# Patient Record
Sex: FEMALE | Race: WHITE | Employment: FULL TIME | URBAN - METROPOLITAN AREA
[De-identification: names, ages, dates, MRNs, and addresses within clinical notes are randomized per-mention and may not be internally consistent; named-entity substitution may affect disease eponyms.]

---

## 2017-01-13 PROCEDURE — 86334 IMMUNOFIX E-PHORESIS SERUM: CPT | Performed by: INTERNAL MEDICINE

## 2017-01-13 PROCEDURE — 84156 ASSAY OF PROTEIN URINE: CPT | Performed by: INTERNAL MEDICINE

## 2017-01-13 PROCEDURE — 82570 ASSAY OF URINE CREATININE: CPT | Performed by: INTERNAL MEDICINE

## 2017-01-13 PROCEDURE — 80069 RENAL FUNCTION PANEL: CPT | Performed by: INTERNAL MEDICINE

## 2017-01-13 PROCEDURE — 82784 ASSAY IGA/IGD/IGG/IGM EACH: CPT | Performed by: INTERNAL MEDICINE

## 2017-01-13 PROCEDURE — 84165 PROTEIN E-PHORESIS SERUM: CPT | Performed by: INTERNAL MEDICINE

## 2017-01-13 PROCEDURE — 83883 ASSAY NEPHELOMETRY NOT SPEC: CPT | Performed by: INTERNAL MEDICINE

## 2017-01-29 ENCOUNTER — HOSPITAL ENCOUNTER (EMERGENCY)
Facility: HOSPITAL | Age: 47
Discharge: HOME | End: 2017-01-29
Attending: EMERGENCY MEDICINE
Payer: COMMERCIAL

## 2017-01-29 VITALS
BODY MASS INDEX: 45.99 KG/M2 | OXYGEN SATURATION: 94 % | HEART RATE: 109 BPM | HEIGHT: 67 IN | WEIGHT: 293 LBS | SYSTOLIC BLOOD PRESSURE: 104 MMHG | TEMPERATURE: 96 F | DIASTOLIC BLOOD PRESSURE: 73 MMHG | RESPIRATION RATE: 20 BRPM

## 2017-01-29 DIAGNOSIS — N30.01 ACUTE CYSTITIS WITH HEMATURIA: Primary | ICD-10-CM

## 2017-01-29 LAB
ALBUMIN SERPL-MCNC: 2.1 G/DL (ref 3.5–4.8)
ALP LIVER SERPL-CCNC: 78 U/L (ref 37–98)
ALT SERPL-CCNC: 23 U/L (ref 14–54)
AST SERPL-CCNC: 9 U/L (ref 15–41)
BASOPHILS # BLD AUTO: 0.05 X10(3) UL (ref 0–0.1)
BASOPHILS NFR BLD AUTO: 0.2 %
BILIRUB SERPL-MCNC: 0.2 MG/DL (ref 0.1–2)
BILIRUB UR QL STRIP.AUTO: NEGATIVE
BUN BLD-MCNC: 17 MG/DL (ref 8–20)
CALCIUM BLD-MCNC: 8 MG/DL (ref 8.3–10.3)
CHLORIDE: 102 MMOL/L (ref 101–111)
CO2: 33 MMOL/L (ref 22–32)
COLOR UR AUTO: YELLOW
CREAT BLD-MCNC: 0.84 MG/DL (ref 0.55–1.02)
EOSINOPHIL # BLD AUTO: 0.07 X10(3) UL (ref 0–0.3)
EOSINOPHIL NFR BLD AUTO: 0.3 %
ERYTHROCYTE [DISTWIDTH] IN BLOOD BY AUTOMATED COUNT: 14.6 % (ref 11.5–16)
GLUCOSE BLD-MCNC: 193 MG/DL (ref 70–99)
GLUCOSE UR STRIP.AUTO-MCNC: NEGATIVE MG/DL
HCT VFR BLD AUTO: 42.2 % (ref 34–50)
HGB BLD-MCNC: 13.5 G/DL (ref 12–16)
IMMATURE GRANULOCYTE COUNT: 0.22 X10(3) UL (ref 0–1)
IMMATURE GRANULOCYTE RATIO %: 1.1 %
KETONES UR STRIP.AUTO-MCNC: NEGATIVE MG/DL
LYMPHOCYTES # BLD AUTO: 2.84 X10(3) UL (ref 0.9–4)
LYMPHOCYTES NFR BLD AUTO: 14.1 %
M PROTEIN MFR SERPL ELPH: 5.1 G/DL (ref 6.1–8.3)
MCH RBC QN AUTO: 31 PG (ref 27–33.2)
MCHC RBC AUTO-ENTMCNC: 32 G/DL (ref 31–37)
MCV RBC AUTO: 96.8 FL (ref 81–100)
MONOCYTES # BLD AUTO: 0.84 X10(3) UL (ref 0.1–0.6)
MONOCYTES NFR BLD AUTO: 4.2 %
NEUTROPHIL ABS PRELIM: 16.1 X10 (3) UL (ref 1.3–6.7)
NEUTROPHILS # BLD AUTO: 16.1 X10(3) UL (ref 1.3–6.7)
NEUTROPHILS NFR BLD AUTO: 80.1 %
NITRITE UR QL STRIP.AUTO: POSITIVE
PH UR STRIP.AUTO: 6 [PH] (ref 4.5–8)
PLATELET # BLD AUTO: 169 10(3)UL (ref 150–450)
POTASSIUM SERPL-SCNC: 3.9 MMOL/L (ref 3.6–5.1)
PROT UR STRIP.AUTO-MCNC: >=500 MG/DL
RBC # BLD AUTO: 4.36 X10(6)UL (ref 3.8–5.1)
RBC #/AREA URNS AUTO: >10 /HPF
RED CELL DISTRIBUTION WIDTH-SD: 51.9 FL (ref 35.1–46.3)
SODIUM SERPL-SCNC: 143 MMOL/L (ref 136–144)
SP GR UR STRIP.AUTO: 1.02 (ref 1–1.03)
UROBILINOGEN UR STRIP.AUTO-MCNC: <2 MG/DL
WBC # BLD AUTO: 20.1 X10(3) UL (ref 4–13)
WBC #/AREA URNS AUTO: >50 /HPF

## 2017-01-29 PROCEDURE — 36415 COLL VENOUS BLD VENIPUNCTURE: CPT

## 2017-01-29 PROCEDURE — 87086 URINE CULTURE/COLONY COUNT: CPT | Performed by: EMERGENCY MEDICINE

## 2017-01-29 PROCEDURE — 99283 EMERGENCY DEPT VISIT LOW MDM: CPT

## 2017-01-29 PROCEDURE — 81001 URINALYSIS AUTO W/SCOPE: CPT | Performed by: EMERGENCY MEDICINE

## 2017-01-29 PROCEDURE — 87186 SC STD MICRODIL/AGAR DIL: CPT | Performed by: EMERGENCY MEDICINE

## 2017-01-29 PROCEDURE — 85025 COMPLETE CBC W/AUTO DIFF WBC: CPT | Performed by: EMERGENCY MEDICINE

## 2017-01-29 PROCEDURE — 87077 CULTURE AEROBIC IDENTIFY: CPT | Performed by: EMERGENCY MEDICINE

## 2017-01-29 PROCEDURE — 80053 COMPREHEN METABOLIC PANEL: CPT | Performed by: EMERGENCY MEDICINE

## 2017-01-29 RX ORDER — CIPROFLOXACIN 250 MG/1
250 TABLET, FILM COATED ORAL 2 TIMES DAILY
Qty: 14 TABLET | Refills: 0 | Status: SHIPPED | OUTPATIENT
Start: 2017-01-29 | End: 2017-02-05

## 2017-01-30 NOTE — ED PROVIDER NOTES
Patient Seen in: [facility] Emergency Department    History   Patient presents with:  Bleeding (hematologic)    Stated Complaint: hematuria-chemo last Friday    HPI    Patient presents with pink tinged urine.   The patient states that she noticed the insulin aspart 100 UNIT/ML Subcutaneous Solution Pen-injector,  Inject 15-30 Units into the skin TID CC and HS. Use sliding scale as previous instructed.    Prochlorperazine Maleate (COMPAZINE) 10 mg tablet,  Take 1 tablet (10 mg total) by mouth every 6 (si 01/29/17 2016 140/93 mmHg   Pulse 01/29/17 2016 120   Resp 01/29/17 2016 24   Temp 01/29/17 2016 96.2 °F (35.7 °C)   Temp src 01/29/17 2016 Temporal   SpO2 01/29/17 2016 92 %   O2 Device 01/29/17 2016 None (Room air)       Current:/73 mmHg  Pulse 109 DIFFERENTIAL WITH PLATELET.   Procedure                               Abnormality         Status                     ---------                               -----------         ------                     CBC W/ DIFFERENTIAL[[id number]]          Abnormal

## 2017-01-30 NOTE — ED INITIAL ASSESSMENT (HPI)
Pt presents with c/o hematuria. Chemo for vasculitis one week ago, was told one of the side effects could be blood in the urine. Dysuria, no fevers, no lower abdominal pain.

## 2017-02-02 ENCOUNTER — HOSPITAL ENCOUNTER (EMERGENCY)
Facility: HOSPITAL | Age: 47
Discharge: HOME | End: 2017-02-02
Attending: EMERGENCY MEDICINE
Payer: COMMERCIAL

## 2017-02-02 VITALS
DIASTOLIC BLOOD PRESSURE: 72 MMHG | HEIGHT: 67 IN | SYSTOLIC BLOOD PRESSURE: 116 MMHG | OXYGEN SATURATION: 100 % | RESPIRATION RATE: 22 BRPM | TEMPERATURE: 100 F | BODY MASS INDEX: 45.99 KG/M2 | HEART RATE: 120 BPM | WEIGHT: 293 LBS

## 2017-02-02 DIAGNOSIS — K52.9 GASTROENTERITIS: Primary | ICD-10-CM

## 2017-02-02 LAB
ALBUMIN SERPL-MCNC: 2.5 G/DL (ref 3.5–4.8)
ALP LIVER SERPL-CCNC: 72 U/L (ref 37–98)
ALT SERPL-CCNC: 23 U/L (ref 14–54)
AST SERPL-CCNC: 13 U/L (ref 15–41)
BASOPHILS # BLD AUTO: 0.07 X10(3) UL (ref 0–0.1)
BASOPHILS NFR BLD AUTO: 0.6 %
BILIRUB SERPL-MCNC: 0.7 MG/DL (ref 0.1–2)
BILIRUB UR QL STRIP.AUTO: NEGATIVE
BUN BLD-MCNC: 17 MG/DL (ref 8–20)
CALCIUM BLD-MCNC: 8.6 MG/DL (ref 8.3–10.3)
CHLORIDE: 103 MMOL/L (ref 101–111)
CO2: 32 MMOL/L (ref 22–32)
CREAT BLD-MCNC: 0.88 MG/DL (ref 0.55–1.02)
EOSINOPHIL # BLD AUTO: 0.11 X10(3) UL (ref 0–0.3)
EOSINOPHIL NFR BLD AUTO: 0.9 %
ERYTHROCYTE [DISTWIDTH] IN BLOOD BY AUTOMATED COUNT: 15 % (ref 11.5–16)
GLUCOSE BLD-MCNC: 162 MG/DL (ref 70–99)
GLUCOSE UR STRIP.AUTO-MCNC: NEGATIVE MG/DL
HCT VFR BLD AUTO: 50.5 % (ref 34–50)
HGB BLD-MCNC: 16.3 G/DL (ref 12–16)
IMMATURE GRANULOCYTE COUNT: 0.17 X10(3) UL (ref 0–1)
IMMATURE GRANULOCYTE RATIO %: 1.4 %
KETONES UR STRIP.AUTO-MCNC: NEGATIVE MG/DL
LEUKOCYTE ESTERASE UR QL STRIP.AUTO: NEGATIVE
LYMPHOCYTES # BLD AUTO: 0.94 X10(3) UL (ref 0.9–4)
LYMPHOCYTES NFR BLD AUTO: 7.8 %
M PROTEIN MFR SERPL ELPH: 5.9 G/DL (ref 6.1–8.3)
MCH RBC QN AUTO: 31.2 PG (ref 27–33.2)
MCHC RBC AUTO-ENTMCNC: 32.3 G/DL (ref 31–37)
MCV RBC AUTO: 96.7 FL (ref 81–100)
MONOCYTES # BLD AUTO: 0.66 X10(3) UL (ref 0.1–0.6)
MONOCYTES NFR BLD AUTO: 5.5 %
NEUTROPHIL ABS PRELIM: 10.12 X10 (3) UL (ref 1.3–6.7)
NEUTROPHILS # BLD AUTO: 10.12 X10(3) UL (ref 1.3–6.7)
NEUTROPHILS NFR BLD AUTO: 83.8 %
NITRITE UR QL STRIP.AUTO: NEGATIVE
PH UR STRIP.AUTO: 5 [PH] (ref 4.5–8)
PLATELET # BLD AUTO: 168 10(3)UL (ref 150–450)
POTASSIUM SERPL-SCNC: 3.7 MMOL/L (ref 3.6–5.1)
PROT UR STRIP.AUTO-MCNC: >=500 MG/DL
RBC # BLD AUTO: 5.22 X10(6)UL (ref 3.8–5.1)
RBC #/AREA URNS AUTO: >10 /HPF
RED CELL DISTRIBUTION WIDTH-SD: 52.2 FL (ref 35.1–46.3)
SODIUM SERPL-SCNC: 142 MMOL/L (ref 136–144)
SP GR UR STRIP.AUTO: 1.02 (ref 1–1.03)
UROBILINOGEN UR STRIP.AUTO-MCNC: <2 MG/DL
WBC # BLD AUTO: 12.1 X10(3) UL (ref 4–13)

## 2017-02-02 PROCEDURE — 99284 EMERGENCY DEPT VISIT MOD MDM: CPT

## 2017-02-02 PROCEDURE — 81001 URINALYSIS AUTO W/SCOPE: CPT | Performed by: EMERGENCY MEDICINE

## 2017-02-02 PROCEDURE — 96361 HYDRATE IV INFUSION ADD-ON: CPT

## 2017-02-02 PROCEDURE — 96360 HYDRATION IV INFUSION INIT: CPT

## 2017-02-02 PROCEDURE — 80053 COMPREHEN METABOLIC PANEL: CPT

## 2017-02-02 PROCEDURE — 87086 URINE CULTURE/COLONY COUNT: CPT | Performed by: EMERGENCY MEDICINE

## 2017-02-02 PROCEDURE — 85025 COMPLETE CBC W/AUTO DIFF WBC: CPT

## 2017-02-02 PROCEDURE — 99283 EMERGENCY DEPT VISIT LOW MDM: CPT

## 2017-02-02 RX ORDER — ONDANSETRON 4 MG/1
4 TABLET, ORALLY DISINTEGRATING ORAL EVERY 4 HOURS PRN
Qty: 10 TABLET | Refills: 0 | Status: SHIPPED | OUTPATIENT
Start: 2017-02-02 | End: 2017-02-09

## 2017-02-02 NOTE — ED PROVIDER NOTES
Patient Seen in: [facility] Emergency Department    History   Patient presents with:  Nausea/Vomiting/Diarrhea (gastrointestinal)    Stated Complaint: n/v/d    HPI    Patient presents with vomiting and diarrhea.   The symptoms started at 3 in the mor X 8 MM Does not apply Misc,  Use with insulin pen   KENDRICK MICROLET LANCETS Does not apply Misc,  Test blood sugar tid   Alcohol Swabs (ALCOHOL PREP) Does not apply Pads,  Use alcohol swab to clean skin before injecting insulin   insulin aspart 100 UNIT/ML HPI.    Physical Exam     ED Triage Vitals   BP 02/02/17 1140 122/69 mmHg   Pulse 02/02/17 1140 126   Resp 02/02/17 1140 24   Temp 02/02/17 1140 99.8 °F (37.7 °C)   Temp src 02/02/17 1140 Temporal   SpO2 02/02/17 1140 94 %   O2 Device 02/02/17 1140 None (R Abnormality         Status                     ---------                               -----------         ------                     CBC W/ DIFFERENTIAL[[id number]]          Abnormal            Final result                 Please view results for these paulino

## 2017-02-02 NOTE — ED NOTES
Rounded on pt, she is drinking fluids well. sts she is still unable to urinate for urine spec. Supportive friend remains at bedside. Updated on POC.

## 2017-02-02 NOTE — ED NOTES
Rounded on pt, sts he is very thirsty. Per MD, ok for PO. Juice and water given. Friends at bedside. Updated on POC.

## 2017-02-24 PROCEDURE — 82570 ASSAY OF URINE CREATININE: CPT | Performed by: INTERNAL MEDICINE

## 2017-02-24 PROCEDURE — 84156 ASSAY OF PROTEIN URINE: CPT | Performed by: INTERNAL MEDICINE

## 2017-03-10 ENCOUNTER — HOSPITAL ENCOUNTER (OUTPATIENT)
Dept: OCCUPATIONAL MEDICINE | Facility: HOSPITAL | Age: 47
Setting detail: THERAPIES SERIES
End: 2017-03-10
Attending: INTERNAL MEDICINE
Payer: COMMERCIAL

## 2017-03-14 PROCEDURE — 87077 CULTURE AEROBIC IDENTIFY: CPT | Performed by: EMERGENCY MEDICINE

## 2017-03-14 PROCEDURE — 87186 SC STD MICRODIL/AGAR DIL: CPT | Performed by: EMERGENCY MEDICINE

## 2017-03-14 PROCEDURE — 87086 URINE CULTURE/COLONY COUNT: CPT | Performed by: EMERGENCY MEDICINE

## 2017-03-15 ENCOUNTER — HOSPITAL ENCOUNTER (OUTPATIENT)
Dept: OCCUPATIONAL MEDICINE | Facility: HOSPITAL | Age: 47
Setting detail: THERAPIES SERIES
Discharge: HOME | End: 2017-03-15
Attending: INTERNAL MEDICINE
Payer: COMMERCIAL

## 2017-03-15 DIAGNOSIS — R60.0 LOCALIZED EDEMA: Primary | ICD-10-CM

## 2017-03-15 PROCEDURE — 97167 OT EVAL HIGH COMPLEX 60 MIN: CPT

## 2017-03-15 PROCEDURE — 97535 SELF CARE MNGMENT TRAINING: CPT

## 2017-03-15 NOTE — PROGRESS NOTES
OCCUPATIONAL THERAPY LE LYMPHEDEMA EVALUATION:   Referring Physician: Dr. Thaddeus Chacko  Diagnosis: Localized edema (R60.0) in abdomen      Date of Service: 3/15/2017     PATIENT Veronica Shaw is a 40year old y/o female who presents with report of ons per-oral esophageal myotomy for achalasia in 2011;  hernia repair 2008; cardiac arrhythmia; RF ablation for supraventricular tachycardia in 1994; obesity; hidradenitis supporativa; HTN  Precautions:  Lymphedema; renal disease; abdominal sxs; cardiac; HTN garments for her lower legs to assist with volume reduction prior to transitioning into compression stockings.  It is also highly likely that she will need a sequential compression device to assist her with life-long self-management of her volume with an ad Proximal 1/2 of lower legs are boggy; distal 1/2 are densely boggy, pitting with medial surface being more involved than lateral. Ankles are boggy, slightly pitting. Dorsal feet are boggy, pitting. Good tissue hydration.  Negative Stemmer's sign bilaterally lymph drainage, and lymphedema precautions for life-long self-management of lymphedema.  30 sessions     Frequency / Duration: Patient will be seen for 2 x/week or a total of 30 visits over a 90 day period to address edema volume in her upper quadrant and p

## 2017-03-17 PROBLEM — I89.0 LYMPHEDEMA OF BOTH UPPER EXTREMITIES: Status: ACTIVE | Noted: 2017-03-17

## 2017-03-17 PROBLEM — I89.0 LYMPHEDEMA OF BOTH UPPER EXTREMITIES: Status: RESOLVED | Noted: 2017-03-17 | Resolved: 2017-03-17

## 2017-03-17 PROBLEM — I89.0 LYMPHEDEMA OF BOTH LOWER EXTREMITIES: Status: ACTIVE | Noted: 2017-03-17

## 2017-03-20 ENCOUNTER — APPOINTMENT (OUTPATIENT)
Dept: OCCUPATIONAL MEDICINE | Facility: HOSPITAL | Age: 47
End: 2017-03-20
Attending: INTERNAL MEDICINE
Payer: COMMERCIAL

## 2017-03-21 ENCOUNTER — APPOINTMENT (OUTPATIENT)
Dept: OCCUPATIONAL MEDICINE | Facility: HOSPITAL | Age: 47
End: 2017-03-21
Attending: INTERNAL MEDICINE
Payer: COMMERCIAL

## 2017-03-23 ENCOUNTER — HOSPITAL ENCOUNTER (OUTPATIENT)
Dept: OCCUPATIONAL MEDICINE | Facility: HOSPITAL | Age: 47
Setting detail: THERAPIES SERIES
Discharge: HOME | End: 2017-03-23
Attending: INTERNAL MEDICINE
Payer: COMMERCIAL

## 2017-03-23 PROCEDURE — 97140 MANUAL THERAPY 1/> REGIONS: CPT

## 2017-03-23 PROCEDURE — 97535 SELF CARE MNGMENT TRAINING: CPT

## 2017-03-24 NOTE — PROGRESS NOTES
Dx: Localized edema (R60.0) in abdomen         Authorized # of Visits:  2/30       Next MD visit/plan renewal:  ???  Fall Risk: Standard          Precautions:  Lymphedema; poor standing / ambulation endurance.         Subjective:   Pt reporting she had a ba be independent in use of compression garments, self-manual lymph drainage, and lymphedema precautions for life-long self-management of lymphedema. 30 sessions     Plan:   Continue OT per POC to obtain above goals.     Charges: 2xMT, 2x self care home manage

## 2017-03-28 ENCOUNTER — HOSPITAL ENCOUNTER (OUTPATIENT)
Dept: OCCUPATIONAL MEDICINE | Facility: HOSPITAL | Age: 47
Setting detail: THERAPIES SERIES
Discharge: HOME | End: 2017-03-28
Attending: INTERNAL MEDICINE
Payer: COMMERCIAL

## 2017-03-28 PROCEDURE — 36415 COLL VENOUS BLD VENIPUNCTURE: CPT | Performed by: INTERNAL MEDICINE

## 2017-03-28 PROCEDURE — 82570 ASSAY OF URINE CREATININE: CPT | Performed by: INTERNAL MEDICINE

## 2017-03-28 PROCEDURE — 84156 ASSAY OF PROTEIN URINE: CPT | Performed by: INTERNAL MEDICINE

## 2017-03-28 PROCEDURE — 97140 MANUAL THERAPY 1/> REGIONS: CPT

## 2017-03-29 NOTE — PROGRESS NOTES
Dx: Localized edema (R60.0) in abdomen         Authorized # of Visits:  3/30       Next MD visit/plan renewal:  4/15/2017  Fall Risk: Standard          Precautions:  Lymphedema; renal disease; abdominal sxs; cardiac; HTN      Subjective:   *Pt reports she area.   *With caregiver needing to leave upon returning pt to home, advised pt to use towel roll/robe belt method to elevate panniculus upon return to home.    COMPRESSION:  N/A    Assessment:   Pt with good follow through with recommendations for home elev

## 2017-03-30 ENCOUNTER — HOSPITAL ENCOUNTER (OUTPATIENT)
Dept: OCCUPATIONAL MEDICINE | Facility: HOSPITAL | Age: 47
Setting detail: THERAPIES SERIES
Discharge: HOME | End: 2017-03-30
Attending: INTERNAL MEDICINE
Payer: COMMERCIAL

## 2017-03-30 PROCEDURE — 97140 MANUAL THERAPY 1/> REGIONS: CPT

## 2017-03-30 NOTE — PROGRESS NOTES
Dx: Localized edema (R60.0) in abdomen         Authorized # of Visits:  4/30       Next MD visit/plan renewal:  4/15/2017  Fall Risk: Standard          Precautions:  Lymphedema; renal disease; abdominal sxs; cardiac; HTN      Subjective:   *Pt reports she sessions  3. Pt will tolerate use of abdominal binder for panniculus compression for  2-4 hours on days that caregiver is present. 6 sessions  4.  Pt will be independent in self-manual lymph drainage of upper quadrant and panniculus in areas of functional r

## 2017-04-04 ENCOUNTER — TELEPHONE (OUTPATIENT)
Dept: OCCUPATIONAL MEDICINE | Facility: HOSPITAL | Age: 47
End: 2017-04-04

## 2017-04-04 ENCOUNTER — APPOINTMENT (OUTPATIENT)
Dept: OCCUPATIONAL MEDICINE | Facility: HOSPITAL | Age: 47
End: 2017-04-04
Attending: INTERNAL MEDICINE
Payer: COMMERCIAL

## 2017-04-04 NOTE — TELEPHONE ENCOUNTER
Message received that pt called and cancelled today's session as she did not have a caregiver to provide needed transportation and she is unable to physically drive herself today.

## 2017-04-06 ENCOUNTER — HOSPITAL ENCOUNTER (OUTPATIENT)
Dept: OCCUPATIONAL MEDICINE | Facility: HOSPITAL | Age: 47
Setting detail: THERAPIES SERIES
Discharge: HOME | End: 2017-04-06
Attending: INTERNAL MEDICINE
Payer: COMMERCIAL

## 2017-04-06 PROCEDURE — 97140 MANUAL THERAPY 1/> REGIONS: CPT

## 2017-04-07 ENCOUNTER — TELEPHONE (OUTPATIENT)
Dept: OCCUPATIONAL MEDICINE | Facility: HOSPITAL | Age: 47
End: 2017-04-07

## 2017-04-07 ENCOUNTER — APPOINTMENT (OUTPATIENT)
Dept: GENERAL RADIOLOGY | Facility: HOSPITAL | Age: 47
DRG: 206 | End: 2017-04-07
Attending: EMERGENCY MEDICINE
Payer: COMMERCIAL

## 2017-04-07 ENCOUNTER — APPOINTMENT (OUTPATIENT)
Dept: CT IMAGING | Facility: HOSPITAL | Age: 47
DRG: 206 | End: 2017-04-07
Attending: EMERGENCY MEDICINE
Payer: COMMERCIAL

## 2017-04-07 ENCOUNTER — HOSPITAL ENCOUNTER (INPATIENT)
Facility: HOSPITAL | Age: 47
LOS: 1 days | Discharge: HOME | DRG: 206 | End: 2017-04-08
Attending: EMERGENCY MEDICINE | Admitting: HOSPITALIST
Payer: COMMERCIAL

## 2017-04-07 DIAGNOSIS — R09.02 HYPOXIA: ICD-10-CM

## 2017-04-07 DIAGNOSIS — R06.00 DYSPNEA, UNSPECIFIED TYPE: Primary | ICD-10-CM

## 2017-04-07 PROBLEM — E87.3 METABOLIC ALKALOSIS: Status: ACTIVE | Noted: 2017-04-07

## 2017-04-07 PROBLEM — R73.9 HYPERGLYCEMIA: Status: ACTIVE | Noted: 2017-04-07

## 2017-04-07 PROBLEM — E87.6 HYPOKALEMIA: Status: ACTIVE | Noted: 2017-04-07

## 2017-04-07 PROBLEM — D72.829 LEUKOCYTOSIS: Status: ACTIVE | Noted: 2017-04-07

## 2017-04-07 PROCEDURE — 93005 ELECTROCARDIOGRAM TRACING: CPT

## 2017-04-07 PROCEDURE — 93010 ELECTROCARDIOGRAM REPORT: CPT

## 2017-04-07 PROCEDURE — 85610 PROTHROMBIN TIME: CPT | Performed by: EMERGENCY MEDICINE

## 2017-04-07 PROCEDURE — 96360 HYDRATION IV INFUSION INIT: CPT

## 2017-04-07 PROCEDURE — 85378 FIBRIN DEGRADE SEMIQUANT: CPT | Performed by: EMERGENCY MEDICINE

## 2017-04-07 PROCEDURE — 99285 EMERGENCY DEPT VISIT HI MDM: CPT

## 2017-04-07 PROCEDURE — 84145 PROCALCITONIN (PCT): CPT | Performed by: EMERGENCY MEDICINE

## 2017-04-07 PROCEDURE — 85730 THROMBOPLASTIN TIME PARTIAL: CPT | Performed by: EMERGENCY MEDICINE

## 2017-04-07 PROCEDURE — 82962 GLUCOSE BLOOD TEST: CPT

## 2017-04-07 PROCEDURE — 84484 ASSAY OF TROPONIN QUANT: CPT | Performed by: EMERGENCY MEDICINE

## 2017-04-07 PROCEDURE — 85025 COMPLETE CBC W/AUTO DIFF WBC: CPT | Performed by: EMERGENCY MEDICINE

## 2017-04-07 PROCEDURE — 80053 COMPREHEN METABOLIC PANEL: CPT | Performed by: EMERGENCY MEDICINE

## 2017-04-07 PROCEDURE — 96361 HYDRATE IV INFUSION ADD-ON: CPT

## 2017-04-07 PROCEDURE — 71010 XR CHEST AP PORTABLE  (CPT=71010): CPT

## 2017-04-07 RX ORDER — ONDANSETRON 2 MG/ML
4 INJECTION INTRAMUSCULAR; INTRAVENOUS EVERY 6 HOURS PRN
Status: DISCONTINUED | OUTPATIENT
Start: 2017-04-07 | End: 2017-04-08

## 2017-04-07 RX ORDER — METOCLOPRAMIDE HYDROCHLORIDE 5 MG/ML
10 INJECTION INTRAMUSCULAR; INTRAVENOUS EVERY 8 HOURS PRN
Status: DISCONTINUED | OUTPATIENT
Start: 2017-04-07 | End: 2017-04-08

## 2017-04-07 RX ORDER — PREDNISONE 10 MG/1
10 TABLET ORAL DAILY
Status: DISCONTINUED | OUTPATIENT
Start: 2017-04-07 | End: 2017-04-08

## 2017-04-07 RX ORDER — ATORVASTATIN CALCIUM 40 MG/1
40 TABLET, FILM COATED ORAL NIGHTLY
Status: DISCONTINUED | OUTPATIENT
Start: 2017-04-07 | End: 2017-04-08

## 2017-04-07 RX ORDER — SODIUM CHLORIDE 9 MG/ML
INJECTION, SOLUTION INTRAVENOUS CONTINUOUS
Status: DISCONTINUED | OUTPATIENT
Start: 2017-04-07 | End: 2017-04-08

## 2017-04-07 RX ORDER — LISINOPRIL 40 MG/1
40 TABLET ORAL DAILY
COMMUNITY

## 2017-04-07 RX ORDER — DEXTROSE MONOHYDRATE 25 G/50ML
50 INJECTION, SOLUTION INTRAVENOUS
Status: DISCONTINUED | OUTPATIENT
Start: 2017-04-07 | End: 2017-04-08

## 2017-04-07 RX ORDER — LISINOPRIL 40 MG/1
40 TABLET ORAL DAILY
Status: DISCONTINUED | OUTPATIENT
Start: 2017-04-08 | End: 2017-04-08

## 2017-04-07 RX ORDER — ONDANSETRON 2 MG/ML
4 INJECTION INTRAMUSCULAR; INTRAVENOUS EVERY 4 HOURS PRN
Status: CANCELLED | OUTPATIENT
Start: 2017-04-07

## 2017-04-07 RX ORDER — PANTOPRAZOLE SODIUM 40 MG/1
40 TABLET, DELAYED RELEASE ORAL
Status: DISCONTINUED | OUTPATIENT
Start: 2017-04-08 | End: 2017-04-08

## 2017-04-07 RX ORDER — HEPARIN SODIUM 5000 [USP'U]/ML
5000 INJECTION, SOLUTION INTRAVENOUS; SUBCUTANEOUS EVERY 8 HOURS
Status: DISCONTINUED | OUTPATIENT
Start: 2017-04-07 | End: 2017-04-08

## 2017-04-07 RX ORDER — POTASSIUM CHLORIDE 20 MEQ/1
40 TABLET, EXTENDED RELEASE ORAL EVERY 4 HOURS
Status: COMPLETED | OUTPATIENT
Start: 2017-04-07 | End: 2017-04-08

## 2017-04-07 RX ORDER — HYDROCODONE BITARTRATE AND ACETAMINOPHEN 5; 325 MG/1; MG/1
1 TABLET ORAL 2 TIMES DAILY PRN
Status: DISCONTINUED | OUTPATIENT
Start: 2017-04-07 | End: 2017-04-08

## 2017-04-07 RX ORDER — ACETAMINOPHEN 325 MG/1
650 TABLET ORAL EVERY 6 HOURS PRN
Status: DISCONTINUED | OUTPATIENT
Start: 2017-04-07 | End: 2017-04-08

## 2017-04-07 NOTE — PROGRESS NOTES
Dx: Localized edema (R60.0) in abdomen         Authorized # of Visits:  5/30       Next MD visit/plan renewal:  4/15/2017  Fall Risk: Standard          Precautions:  Lymphedema; renal disease; abdominal sxs; cardiac; HTN      Subjective:   *Pt reports she conversation. Due to physicians being aware of pt's O2 saturations and pending work-up, clinical decision made to apply 3.0L O2 via nasal cannual while in session to assure appropriate saturation levels while in clinic as pt needed to drive herself home.  Cecil Loveless lower leg. *Discussed caregiver's difficulty with applying abdominal binders correctly. Recommended that pt stand to have back of binder pulled over buttocks/hips to avoid rolling of binder. Pt stating she will work with caregiver on this.    *Pt requeste

## 2017-04-07 NOTE — TELEPHONE ENCOUNTER
Initiated conversation with Dr. Joseph Pearson RN, [place], re: pt's O2 saturations at last evening's session.  Roslyn took therapist's number, stating that Dr. Ambar Cerda is on call today and she will have Dr. Ambar Cerda contact therapist.

## 2017-04-08 ENCOUNTER — APPOINTMENT (OUTPATIENT)
Dept: CV DIAGNOSTICS | Facility: HOSPITAL | Age: 47
DRG: 206 | End: 2017-04-08
Attending: HOSPITALIST
Payer: COMMERCIAL

## 2017-04-08 ENCOUNTER — APPOINTMENT (OUTPATIENT)
Dept: NUCLEAR MEDICINE | Facility: HOSPITAL | Age: 47
DRG: 206 | End: 2017-04-08
Attending: HOSPITALIST
Payer: COMMERCIAL

## 2017-04-08 VITALS
DIASTOLIC BLOOD PRESSURE: 70 MMHG | TEMPERATURE: 98 F | SYSTOLIC BLOOD PRESSURE: 109 MMHG | WEIGHT: 293 LBS | HEART RATE: 78 BPM | HEIGHT: 67 IN | OXYGEN SATURATION: 94 % | RESPIRATION RATE: 20 BRPM | BODY MASS INDEX: 45.99 KG/M2

## 2017-04-08 PROCEDURE — 82962 GLUCOSE BLOOD TEST: CPT

## 2017-04-08 PROCEDURE — 93306 TTE W/DOPPLER COMPLETE: CPT

## 2017-04-08 PROCEDURE — 85025 COMPLETE CBC W/AUTO DIFF WBC: CPT | Performed by: HOSPITALIST

## 2017-04-08 PROCEDURE — 83036 HEMOGLOBIN GLYCOSYLATED A1C: CPT | Performed by: HOSPITALIST

## 2017-04-08 PROCEDURE — 80048 BASIC METABOLIC PNL TOTAL CA: CPT | Performed by: HOSPITALIST

## 2017-04-08 PROCEDURE — 93306 TTE W/DOPPLER COMPLETE: CPT | Performed by: INTERNAL MEDICINE

## 2017-04-08 PROCEDURE — 78582 LUNG VENTILAT&PERFUS IMAGING: CPT

## 2017-04-08 RX ORDER — POTASSIUM CHLORIDE 20 MEQ/1
40 TABLET, EXTENDED RELEASE ORAL ONCE
Status: COMPLETED | OUTPATIENT
Start: 2017-04-08 | End: 2017-04-08

## 2017-04-08 NOTE — H&P
[facility] Hospitalist History and Physical      Patient presents with:  Dyspnea ALEXIS SOB (respiratory)       PCP: Mary Beth Brandt MD      History of Present Illness: Patient is a 40year old female with PMH sig for IgA vasculitis, T2DM exacerbated by steroids, Murlean Carrier Disp: 60 tablet Rfl: 0   insulin aspart 100 UNIT/ML Subcutaneous Solution Pen-injector Inject 15-30 Units into the skin TID CC and HS. Use sliding scale as previous instructed.  Disp: 6 pen Rfl: 3   Prochlorperazine Maleate (COMPAZINE) 10 mg tablet Take 1 t adenopathy, thyroid: no enlargment/tenderness/nodules appreciated   Lungs:   Clear to auscultation bilaterally. Normal effort   Chest wall:  No tenderness or deformity.    Heart:  Regular rate and rhythm, S1, S2 normal, no murmur, rub or gallop appreciated 21:39.  INDICATIONS:  shortness of breath - low O2 sats  PATIENT STATED HISTORY: (As transcribed by Technologist)  Patient states of shortness of breath and heaviness on her chest.    FINDINGS:  Cardiomegaly.  The costophrenic angles bilaterally are not wel

## 2017-04-08 NOTE — CM/SW NOTE
MSW updated patient that E will be her 02 provider. Patient knows that she will need to sign a waiver in case insurance will not cover.

## 2017-04-08 NOTE — CONSULTS
[address] Patient Status:  Inpatient    1970 MRN [id number]   [facility] 4NW-A Attending Tianna Petit, DO   Hosp Day # 1 PCP Norvel Lefort, MD        Date of Admission: 2017  5:09 PM  Admission Diagnosis: Hyp history unknown: Yes           Medications:  Reviewed in EMR. REVIEW OF SYSTEMS:   12 point review of systems negative except as mentioned in HPI.      OBJECTIVE:  Patient Vitals for the past 24 hrs:   BP Temp Temp src Pulse Resp SpO2 Height Weight   04 04/08/2017    04/08/2017   CA 8.1 04/08/2017   ALB 2.8 04/07/2017   ALKPHO 80 04/07/2017   BILT 0.4 04/07/2017   TP 6.4 04/07/2017   AST 7 04/07/2017   ALT 15 04/07/2017   PTT 27.9 04/07/2017   INR 1.16 04/07/2017   PTP 14.9 04/07/2017   DDIMER 1.33

## 2017-04-08 NOTE — ED NOTES
Pt given turkey sandwich, apple, apple juice and apple sauce upon request. Pt awaiting admission. Pt has updated her family.

## 2017-04-08 NOTE — PROGRESS NOTES
04/08/17 1452   Results at rest   Resting SpO2 (minimum) 88 %   Resting Oxygen Device None   Results with Ambulation   Ambulation SpO2 (minimum) 85 %   Ambulation oxygen device None  (room air)   Ambulation time (minutes) 5   Recovery Results   Minutes

## 2017-04-08 NOTE — ED PROVIDER NOTES
Patient Seen in: [facility] Emergency Department    History   Patient presents with:  Dyspnea ALEXIS SOB (respiratory)    Stated Complaint: shortness of breath - low O2 sats    HPI    Patient is a pleasant 45-year-old female, with history of IgA vascul 15-30 Units into the skin TID CC and HS. Use sliding scale as previous instructed. Prochlorperazine Maleate (COMPAZINE) 10 mg tablet,  Take 1 tablet (10 mg total) by mouth every 6 (six) hours as needed for Nausea.  Every 6 hours prn nausea or as instructe on the cart, in no apparent distress. HEENT: Head is without evidence of trauma. Extraocular muscles are intact. Pupils are equal and reactive to light. Oropharynx is pink and moist.  NECK: Neck is supple and nontender. The trachea is midline.    LUNGS: L ------                     CBC W/ DIFFERENTIAL[[id number]]          Abnormal            Final result                 Please view results for these tests on the individual orders.    D-DIMER   PROCALCITONIN   RAINBOW DRAW BLUE   RAINBOW DRAW GOLD   RAINBOW [facility] hospitalist.  Pulmonology consultation was obtained from Dr. Soren Masterson, who was notified. D-dimer, pro-calcitonin, and proBNP was added to the patient's laboratory studies. 2D echo and VQ scan were ordered.   Additional evaluation and consultation will

## 2017-04-08 NOTE — PROGRESS NOTES
NURSING ADMISSION NOTE      Patient admitted via Cart from ER, alert and oriented, not on distress. IVfluids infusing and oxygen at 2-3L per nasal cannula. Oriented to room. Safety precautions initiated. Bed in low position, call lights in reach.   Sat

## 2017-04-08 NOTE — PLAN OF CARE
Diabetes/Glucose Control    • Glucose maintained within prescribed range Adequate for Discharge        METABOLIC/FLUID AND ELECTROLYTES - ADULT    • Electrolytes maintained within normal limits Adequate for Discharge    • Glucose maintained within prescrib

## 2017-04-08 NOTE — CM/SW NOTE
04/08/17 1500   CM/SW Referral Data   Referral Source Physician;Social Work (self-referral)   Reason for Referral Discharge planning   Informant Patient   Pertinent Medical Hx   Primary Care Physician Name [address] [place]     MSW spoke to patient,she has no hx of

## 2017-04-10 NOTE — CM/SW NOTE
04/10/17 0900   Discharge disposition   Discharged to: Home or [address] [facility]   Discharge transportation Private car   home O2

## 2017-04-11 ENCOUNTER — APPOINTMENT (OUTPATIENT)
Dept: OCCUPATIONAL MEDICINE | Facility: HOSPITAL | Age: 47
End: 2017-04-11
Attending: INTERNAL MEDICINE
Payer: COMMERCIAL

## 2017-04-11 NOTE — PAYOR COMM NOTE
Attending Physician: No att. providers found    Review Type: ADMISSION   Reviewer: Roel Segovia       Date: April 11, 2017 - 12:10 PM  Payor: KATARZYNA ROSE  Authorization Number: [id number]  Admit date: 4/7/2017  5:09 PM   Admitted from Emergency Dept.: yes females:  D-Dimer results of less than 0.5 ug/mL (FEU) have been shown to contribute to  the exclusion of venous thrombolism with a negative predictive value of  approximately 95% when results are used as part of the total clinical  evaluation of the patie RDW-SD 54.4 (*)     Neutrophil Absolute Prelim 10.41 (*)     Neutrophil Absolute 10.41 (*)     Monocyte Absolute 0.70 (*)     Eosinophil Absolute 0.36 (*)     All other components within normal limits   TROPONIN I - Normal   PTT, ACTIVATED - Normal    Narr possibly related to cytoxan (i.e. Hypersensitivity reaction).  Need to r/o PE, pulm htn, ARTUR/OHS or other potential causes.  Exam and CXR are clear.   -V/Q scan pending, pt could not tolerate supine position for CTA  -Echo pending  -PSG as outpt  -PFT as ou

## 2017-04-13 ENCOUNTER — APPOINTMENT (OUTPATIENT)
Dept: OCCUPATIONAL MEDICINE | Facility: HOSPITAL | Age: 47
End: 2017-04-13
Attending: INTERNAL MEDICINE
Payer: COMMERCIAL

## 2017-04-14 ENCOUNTER — APPOINTMENT (OUTPATIENT)
Dept: OCCUPATIONAL MEDICINE | Facility: HOSPITAL | Age: 47
End: 2017-04-14
Attending: INTERNAL MEDICINE
Payer: COMMERCIAL

## 2017-04-18 ENCOUNTER — HOSPITAL ENCOUNTER (OUTPATIENT)
Dept: OCCUPATIONAL MEDICINE | Facility: HOSPITAL | Age: 47
Setting detail: THERAPIES SERIES
Discharge: HOME | End: 2017-04-18
Attending: INTERNAL MEDICINE
Payer: COMMERCIAL

## 2017-04-18 PROCEDURE — 97535 SELF CARE MNGMENT TRAINING: CPT

## 2017-04-18 PROCEDURE — 97140 MANUAL THERAPY 1/> REGIONS: CPT

## 2017-04-18 NOTE — DISCHARGE SUMMARY
General Medicine Discharge Summary     Patient ID:  Maikol Rincon  40year old  9/21/1970    Admit date: 4/7/2017    Discharge date and time: 4/8/2017  7:00 PM     Attending Physician: Gita att. providers f exacerbated by steroids, obesity who presented to ED with intermittent hypoxia. Hypoxia  -Unclear etiology, intermittent occuring for the past several weeks.  May be hypersensitivity reaction to cytoxan  -CXR personally reviewed, no consolidation  -V/Q s total) by mouth nightly., Normal, Disp-90 tablet, R-3    Glucose-Vitamin C 4-0.006 G Oral Chew Tab  Chew 4 tablets by mouth every 15 (fifteen) minutes as needed (Low blood glucose less than or equal to 70 mg/dL, OR blood glucose  mg/dL with symptoms

## 2017-04-19 NOTE — PROGRESS NOTES
Progress Summary    Pt has attended 6/30, cancelled 1, and no shown 0 visits in Occupational Therapy.      Subjective:   Pt reports she was hospitalized on 4/7 d/t low O2 levels; discharged home on 3.0L O2 for normal activity, 5.0L for exertion, 3.0L for n Therapy x2/week. Being seen for complete decongestive therapy of bilateral lower quadrants with initial phase of treatment focusing on truncal/panniculus lymphedema.  Compression has been applied following treatment sessions via Tensogrip sleeve for lower l surfaces. Proximal 1/2 of lower legs are boggy; distal 1/2 are densely boggy, pitting with medial surface being more involved than lateral. Ankles are boggy, slightly pitting. Dorsal feet are boggy, pitting. Good tissue hydration.  Negative Stemmer's sign b *Manual lymph drainage of trunk/panniculus with soft tissue mobilization in areas of dense lymphedema. *Instruction in caregiver manual lymph drainage completed during MLD session.  Pt and significant other provided with written manual lymph drainage pa

## 2017-04-20 ENCOUNTER — HOSPITAL ENCOUNTER (OUTPATIENT)
Dept: OCCUPATIONAL MEDICINE | Facility: HOSPITAL | Age: 47
Setting detail: THERAPIES SERIES
Discharge: HOME | End: 2017-04-20
Attending: INTERNAL MEDICINE
Payer: COMMERCIAL

## 2017-04-20 PROCEDURE — 97140 MANUAL THERAPY 1/> REGIONS: CPT

## 2017-04-21 NOTE — PROGRESS NOTES
Dx: Localized edema (R60.0) in abdomen         Authorized # of Visits:  7/30       Next MD visit/plan renewal:  5/18/2017  Fall Risk: Standard          Precautions:  Lymphedema; renal disease; abdominal sxs; cardiac; HTN      Subjective:   *Pt reports she and significant other in correct application of lower leg Tensogrip sleeves. Provided pt with extra pair for laundering purposes; instructed in laundering. *Manual lymph drainage of trunk with soft tissue mobilization in areas of dense lymphedema.  Reduct Timed Treatment: 55 min  Total Treatment Time: 60 min

## 2017-04-25 ENCOUNTER — HOSPITAL ENCOUNTER (OUTPATIENT)
Dept: OCCUPATIONAL MEDICINE | Facility: HOSPITAL | Age: 47
Setting detail: THERAPIES SERIES
Discharge: HOME | End: 2017-04-25
Attending: INTERNAL MEDICINE
Payer: COMMERCIAL

## 2017-04-25 PROCEDURE — 97140 MANUAL THERAPY 1/> REGIONS: CPT

## 2017-04-26 NOTE — PROGRESS NOTES
Dx: Localized edema (R60.0) in abdomen         Authorized # of Visits:  8/30       Next MD visit/plan renewal:  5/18/2017  Fall Risk: Standard          Precautions:  Lymphedema; renal disease; abdominal sxs; cardiac; HTN      Subjective:   *Pt reports she MEASUREMENTS:  None taken this date.     TREATMENT:  *Pt labile during first part of session discussing stress with handling her mother's estate; concern re: finances with upcoming FMLA; frustration with needing to have caregivers; fear of upcoming cardia NIRMAL.       Charges: 3 Manual Therapy   Total Timed Treatment: 45 min  Total Treatment Time: 55 min

## 2017-04-27 ENCOUNTER — HOSPITAL ENCOUNTER (OUTPATIENT)
Dept: OCCUPATIONAL MEDICINE | Facility: HOSPITAL | Age: 47
Setting detail: THERAPIES SERIES
Discharge: HOME | End: 2017-04-27
Attending: INTERNAL MEDICINE
Payer: COMMERCIAL

## 2017-04-27 PROCEDURE — 97140 MANUAL THERAPY 1/> REGIONS: CPT

## 2017-04-27 PROCEDURE — 97110 THERAPEUTIC EXERCISES: CPT

## 2017-04-28 PROCEDURE — 82570 ASSAY OF URINE CREATININE: CPT | Performed by: INTERNAL MEDICINE

## 2017-04-28 PROCEDURE — 84156 ASSAY OF PROTEIN URINE: CPT | Performed by: INTERNAL MEDICINE

## 2017-04-28 NOTE — PROGRESS NOTES
Dx: Localized edema (R60.0) in abdomen         Authorized # of Visits:  9/30       Next MD visit/plan renewal:  5/18/2017  Fall Risk: Standard          Precautions:  Lymphedema; renal disease; abdominal sxs; cardiac; HTN      Subjective:   *Pt reports she lymph drainage of trunk and LEs with soft tissue mobilization in areas of dense lymphedema. Reduction in lymphedema density with increase in superficial tissue mobility observed over inferior abdomen by end of session.   *Instructed pt in truncal decongesti 30 sessions  6. Pt will be independent in use of compression garments, self-manual lymph drainage, and lymphedema precautions for life-long self-management of lymphedema.  30 sessions     Plan:   Continue current plan; increase frequency to x3/week when pt

## 2017-05-01 ENCOUNTER — TELEPHONE (OUTPATIENT)
Dept: OCCUPATIONAL MEDICINE | Facility: HOSPITAL | Age: 47
End: 2017-05-01

## 2017-05-02 ENCOUNTER — HOSPITAL ENCOUNTER (OUTPATIENT)
Dept: OCCUPATIONAL MEDICINE | Facility: HOSPITAL | Age: 47
Setting detail: THERAPIES SERIES
Discharge: HOME | End: 2017-05-02
Attending: FAMILY MEDICINE
Payer: COMMERCIAL

## 2017-05-02 ENCOUNTER — APPOINTMENT (OUTPATIENT)
Dept: OCCUPATIONAL MEDICINE | Facility: HOSPITAL | Age: 47
End: 2017-05-02
Attending: FAMILY MEDICINE
Payer: COMMERCIAL

## 2017-05-02 PROCEDURE — 97140 MANUAL THERAPY 1/> REGIONS: CPT

## 2017-05-02 NOTE — PROGRESS NOTES
Dx: Localized edema (R60.0) in abdomen         Authorized # of Visits:  10/30       Next MD visit/plan renewal:  5/18/2017  Fall Risk: Standard          Precautions:  Lymphedema; renal disease; abdominal sxs; cardiac; HTN      Subjective:   *Pt reports she panniculus and over proximal thighs/groin intact without evidence of skin breakdown. MEASUREMENTS:  None taken this date. TREATMENT:  *Compression garment specialist present to measure pt for alternative Velcro-closure garments.  Anticipate delivery of and lymphedema precautions for life-long self-management of lymphedema. 30 sessions     Plan:   Continue current plan.       Charges: 3 Manual Therapy   Total Timed Treatment: 45 min  Total Treatment Time: 60 min

## 2017-05-03 ENCOUNTER — APPOINTMENT (OUTPATIENT)
Dept: OCCUPATIONAL MEDICINE | Facility: HOSPITAL | Age: 47
End: 2017-05-03
Attending: INTERNAL MEDICINE
Payer: COMMERCIAL

## 2017-05-04 ENCOUNTER — HOSPITAL ENCOUNTER (OUTPATIENT)
Dept: OCCUPATIONAL MEDICINE | Facility: HOSPITAL | Age: 47
Setting detail: THERAPIES SERIES
Discharge: HOME | End: 2017-05-04
Attending: FAMILY MEDICINE
Payer: COMMERCIAL

## 2017-05-04 PROCEDURE — 97140 MANUAL THERAPY 1/> REGIONS: CPT

## 2017-05-04 NOTE — PROGRESS NOTES
Dx: Localized edema (R60.0) in abdomen         Authorized # of Visits:  11/30       Next MD visit/plan renewal:  5/18/2017  Fall Risk: Standard          Precautions:  Lymphedema; renal disease; abdominal sxs; cardiac; HTN      Subjective:   *Pt reports she panniculus with lightly pink coloration over most distal surface on the Right and over distal 2/3 on the Left. Tissue over/under panniculus and over proximal thighs/groin intact without evidence of skin breakdown. MEASUREMENTS:  None taken this date. infection risk and to allow pt to complete self-bathing independently. 30 sessions  6. Pt will be independent in use of compression garments, self-manual lymph drainage, and lymphedema precautions for life-long self-management of lymphedema.  30 sessions

## 2017-05-08 ENCOUNTER — TELEPHONE (OUTPATIENT)
Dept: OCCUPATIONAL MEDICINE | Facility: HOSPITAL | Age: 47
End: 2017-05-08

## 2017-05-08 ENCOUNTER — APPOINTMENT (OUTPATIENT)
Dept: OCCUPATIONAL MEDICINE | Facility: HOSPITAL | Age: 47
End: 2017-05-08
Attending: FAMILY MEDICINE
Payer: COMMERCIAL

## 2017-05-09 ENCOUNTER — APPOINTMENT (OUTPATIENT)
Dept: OCCUPATIONAL MEDICINE | Facility: HOSPITAL | Age: 47
End: 2017-05-09
Attending: FAMILY MEDICINE
Payer: COMMERCIAL

## 2017-05-10 ENCOUNTER — HOSPITAL ENCOUNTER (OUTPATIENT)
Dept: OCCUPATIONAL MEDICINE | Facility: HOSPITAL | Age: 47
Setting detail: THERAPIES SERIES
Discharge: HOME | End: 2017-05-10
Attending: FAMILY MEDICINE
Payer: COMMERCIAL

## 2017-05-10 PROCEDURE — 97535 SELF CARE MNGMENT TRAINING: CPT

## 2017-05-10 PROCEDURE — 97140 MANUAL THERAPY 1/> REGIONS: CPT

## 2017-05-10 NOTE — PROGRESS NOTES
Dx: Localized edema (R60.0) in abdomen         Authorized # of Visits:  12/30       Next MD visit/plan renewal:  5/18/2017  Fall Risk: Standard          Precautions:  Lymphedema; renal disease; abdominal sxs; cardiac; HTN      Subjective:   *Pt reports rajiv coloration over most distal surface on the Right and over distal 2/3 on the Left. Tissue over/under panniculus and over proximal thighs/groin intact without evidence of skin breakdown. MEASUREMENTS:  None taken this date.     TREATMENT:  *Pt's alternative edema volume to mild with improved tissue quality to boggy to reduce infection risk and to allow pt to complete self-bathing independently. 30 sessions  6.  Pt will be independent in use of compression garments, self-manual lymph drainage, and lymphedema pr

## 2017-05-11 ENCOUNTER — HOSPITAL ENCOUNTER (OUTPATIENT)
Dept: OCCUPATIONAL MEDICINE | Facility: HOSPITAL | Age: 47
Setting detail: THERAPIES SERIES
Discharge: HOME | End: 2017-05-11
Attending: FAMILY MEDICINE
Payer: COMMERCIAL

## 2017-05-11 PROCEDURE — 81001 URINALYSIS AUTO W/SCOPE: CPT | Performed by: INTERNAL MEDICINE

## 2017-05-11 PROCEDURE — 87086 URINE CULTURE/COLONY COUNT: CPT | Performed by: INTERNAL MEDICINE

## 2017-05-11 PROCEDURE — 97140 MANUAL THERAPY 1/> REGIONS: CPT

## 2017-05-11 NOTE — PROGRESS NOTES
Dx: Localized edema (R60.0) in abdomen         Authorized # of Visits:  13/30       Next MD visit/plan renewal:  5/18/2017  Fall Risk: Standard          Precautions:  Lymphedema; renal disease; abdominal sxs; cardiac; HTN      Subjective:   *Pt reports she bilateral lower legs. Advised to wear for up to 23 hours as tolerated.    COMPRESSION:  *Abdominal binders (at home)  *Bilateral lower legs: liner; Circaid Juxtafit Premium ankle/foot wrap, size medium; Circaid Juxtafit Premium lower leg garment, seize larg

## 2017-05-12 ENCOUNTER — APPOINTMENT (OUTPATIENT)
Dept: OCCUPATIONAL MEDICINE | Facility: HOSPITAL | Age: 47
End: 2017-05-12
Attending: FAMILY MEDICINE
Payer: COMMERCIAL

## 2017-05-16 ENCOUNTER — APPOINTMENT (OUTPATIENT)
Dept: OCCUPATIONAL MEDICINE | Facility: HOSPITAL | Age: 47
End: 2017-05-16
Attending: FAMILY MEDICINE
Payer: COMMERCIAL

## 2017-05-17 ENCOUNTER — APPOINTMENT (OUTPATIENT)
Dept: OCCUPATIONAL MEDICINE | Facility: HOSPITAL | Age: 47
End: 2017-05-17
Attending: FAMILY MEDICINE
Payer: COMMERCIAL

## 2017-05-17 ENCOUNTER — TELEPHONE (OUTPATIENT)
Dept: OCCUPATIONAL MEDICINE | Facility: HOSPITAL | Age: 47
End: 2017-05-17

## 2017-05-17 NOTE — TELEPHONE ENCOUNTER
Message received that pt called and cancelled today's session d/t excessive joint pain and she does not have anyone who can drive her to her appt.

## 2017-05-18 ENCOUNTER — HOSPITAL ENCOUNTER (OUTPATIENT)
Dept: OCCUPATIONAL MEDICINE | Facility: HOSPITAL | Age: 47
Setting detail: THERAPIES SERIES
Discharge: HOME | End: 2017-05-18
Attending: FAMILY MEDICINE
Payer: COMMERCIAL

## 2017-05-18 PROCEDURE — 97140 MANUAL THERAPY 1/> REGIONS: CPT

## 2017-05-18 NOTE — PROGRESS NOTES
Progress Summary    Pt has attended 14/30, cancelled 3, and no shown 0 visits in Occupational Therapy. Subjective:   Pt reports she was able to shower herself independently twice since last session with caregiver present using adaptive equipment.  Romaine therapy of bilateral lower quadrants with soft tissue mobilization of dense areas of lymphedema. She obtained sets of alternative Velcro-closure garments for bilateral lower legs on 5/10.  In the clinic she demonstrated good ability at self-application, h/e reduction of 10.5cm. Density over thighs is supple over anterior surface; densely boggy, pitting over medial/posterior/lateral surfaces. Knees are slightly boggy over medial surfaces; supple over lateral surfaces. Lower legs are boggy, pitting.  Ankles area care.    Thank you for your referral. If you have any questions, please contact me at Dept: [phone]. Sincerely,  Electronically signed by therapist: RADHA Roth/L, RADHA         TREATMENT:  *Re-assessment of status   *Manual lymph drain

## 2017-05-19 ENCOUNTER — APPOINTMENT (OUTPATIENT)
Dept: OCCUPATIONAL MEDICINE | Facility: HOSPITAL | Age: 47
End: 2017-05-19
Attending: FAMILY MEDICINE
Payer: COMMERCIAL

## 2017-05-22 ENCOUNTER — APPOINTMENT (OUTPATIENT)
Dept: OCCUPATIONAL MEDICINE | Facility: HOSPITAL | Age: 47
End: 2017-05-22
Attending: FAMILY MEDICINE
Payer: COMMERCIAL

## 2017-05-23 ENCOUNTER — APPOINTMENT (OUTPATIENT)
Dept: OCCUPATIONAL MEDICINE | Facility: HOSPITAL | Age: 47
End: 2017-05-23
Attending: FAMILY MEDICINE
Payer: COMMERCIAL

## 2017-05-23 ENCOUNTER — TELEPHONE (OUTPATIENT)
Dept: OCCUPATIONAL MEDICINE | Facility: HOSPITAL | Age: 47
End: 2017-05-23

## 2017-05-23 NOTE — TELEPHONE ENCOUNTER
Message received that pt called and cancelled today's session d/t inability to get out of her apartment as she does not have a caregiver today.

## 2017-05-24 ENCOUNTER — APPOINTMENT (OUTPATIENT)
Dept: OCCUPATIONAL MEDICINE | Facility: HOSPITAL | Age: 47
End: 2017-05-24
Attending: FAMILY MEDICINE
Payer: COMMERCIAL

## 2017-05-25 ENCOUNTER — HOSPITAL ENCOUNTER (OUTPATIENT)
Dept: OCCUPATIONAL MEDICINE | Facility: HOSPITAL | Age: 47
Setting detail: THERAPIES SERIES
Discharge: HOME | End: 2017-05-25
Attending: FAMILY MEDICINE
Payer: COMMERCIAL

## 2017-05-25 ENCOUNTER — APPOINTMENT (OUTPATIENT)
Dept: OCCUPATIONAL MEDICINE | Facility: HOSPITAL | Age: 47
End: 2017-05-25
Attending: FAMILY MEDICINE
Payer: COMMERCIAL

## 2017-05-25 PROCEDURE — 97016 VASOPNEUMATIC DEVICE THERAPY: CPT

## 2017-05-26 ENCOUNTER — APPOINTMENT (OUTPATIENT)
Dept: OCCUPATIONAL MEDICINE | Facility: HOSPITAL | Age: 47
End: 2017-05-26
Attending: FAMILY MEDICINE
Payer: COMMERCIAL

## 2017-05-26 NOTE — PROGRESS NOTES
Dx: Localized edema (R60.0) in abdomen         Authorized # of Visits:  15/30       Next MD visit/plan renewal:  6/18/2017  Fall Risk: Standard          Precautions:  Lymphedema; renal disease; abdominal sxs; cardiac; HTN      Subjective:   *Pt reports she bilaterally. MEASUREMENTS:  None taken this date. TREATMENT:  *Compression device specialist present for trial with Flexitouch device.  Pt participated in abbreviated trial over Right lower quadrant with reduction in volume seen over thigh and calf as

## 2017-05-30 ENCOUNTER — APPOINTMENT (OUTPATIENT)
Dept: OCCUPATIONAL MEDICINE | Facility: HOSPITAL | Age: 47
End: 2017-05-30
Attending: FAMILY MEDICINE
Payer: COMMERCIAL

## 2017-05-31 ENCOUNTER — APPOINTMENT (OUTPATIENT)
Dept: OCCUPATIONAL MEDICINE | Facility: HOSPITAL | Age: 47
End: 2017-05-31
Attending: FAMILY MEDICINE
Payer: COMMERCIAL

## 2017-06-01 ENCOUNTER — HOSPITAL ENCOUNTER (OUTPATIENT)
Dept: OCCUPATIONAL MEDICINE | Facility: HOSPITAL | Age: 47
Setting detail: THERAPIES SERIES
Discharge: HOME | End: 2017-06-01
Attending: FAMILY MEDICINE
Payer: COMMERCIAL

## 2017-06-01 PROCEDURE — 97140 MANUAL THERAPY 1/> REGIONS: CPT

## 2017-06-01 NOTE — PROGRESS NOTES
Dx: Localized edema (R60.0) in abdomen         Authorized # of Visits:  16/30       Next MD visit/plan renewal:  6/18/2017  Fall Risk: Standard          Precautions:  Lymphedema; renal disease; abdominal sxs; cardiac; HTN      Subjective:   *Pt stating sapphire presentation. Tissue over/under panniculus and over proximal thighs/groin intact without evidence of skin breakdown. *Lymphedema density over thighs is supple over anterior surface; densely boggy, pitting over medial/posterior/lateral surfaces.  Knees are therapeutic support. Discussed her recent losses--loss of self/identity d/t not working [employer]; loss of mother. Discussed avenues for expression of emotions and both negative and positive events in pt's daily life.  Discussed use of journaling for healthy periods. 4 sessions    5. Reduce panniculus edema volume to mild with improved tissue quality to boggy to reduce infection risk and to allow pt to complete self-bathing independently. 30 sessions  6.  Pt will be independent in use of compression garments, s

## 2017-06-06 ENCOUNTER — HOSPITAL ENCOUNTER (OUTPATIENT)
Dept: OCCUPATIONAL MEDICINE | Facility: HOSPITAL | Age: 47
Setting detail: THERAPIES SERIES
Discharge: HOME | End: 2017-06-06
Attending: FAMILY MEDICINE
Payer: COMMERCIAL

## 2017-06-06 PROCEDURE — 97140 MANUAL THERAPY 1/> REGIONS: CPT

## 2017-06-07 NOTE — PROGRESS NOTES
Dx: Localized edema (R60.0) in abdomen       Authorized # of Visits:  17/30       Next MD visit/plan renewal:  6/18/2017  Fall Risk: Standard          Precautions:  Lymphedema; renal disease; abdominal sxs; cardiac; HTN      Subjective:   *Pt reports she r bilaterally. MEASUREMENTS:  *Left LE circumferential lymphedema volume decreased by 10.3cm since 6/1 for an overall reduction of 14.3cm. *Right LE decreased by 8.7cm for an overall reduction of 12.3cm.    TREATMENT:  *Volume re-measurement   *Shared pt' tolerate use of bilateral lower legs alternative Velcro-closure garments for 20-23 hour periods. 4 sessions ACHIEVED     5.  Reduce panniculus edema volume to mild with improved tissue quality to boggy to reduce infection risk and to allow pt to complete se

## 2017-06-08 ENCOUNTER — HOSPITAL ENCOUNTER (OUTPATIENT)
Dept: OCCUPATIONAL MEDICINE | Facility: HOSPITAL | Age: 47
Setting detail: THERAPIES SERIES
Discharge: HOME | End: 2017-06-08
Attending: FAMILY MEDICINE
Payer: COMMERCIAL

## 2017-06-08 PROCEDURE — 97140 MANUAL THERAPY 1/> REGIONS: CPT

## 2017-06-09 PROCEDURE — 84156 ASSAY OF PROTEIN URINE: CPT | Performed by: INTERNAL MEDICINE

## 2017-06-09 PROCEDURE — 82570 ASSAY OF URINE CREATININE: CPT | Performed by: INTERNAL MEDICINE

## 2017-06-12 NOTE — PROGRESS NOTES
Dx: Localized edema (R60.0) in abdomen       Authorized # of Visits:  18/30       Next MD visit/plan renewal:  6/18/2017  Fall Risk: Standard          Precautions:  Lymphedema; renal disease; abdominal sxs; cardiac; HTN      Subjective:   *Pt reports she t MEASUREMENTS:  None taken   TREATMENT:  *Pointed out to pt the increase in volume over bilateral ankles/feet. Pt stating she is surprised since she just removed garments this morning.  Re-educated pt in the aggressiveness of her lymphedema and need to wea tolerate use of bilateral lower legs alternative Velcro-closure garments for 20-23 hour periods. 4 sessions ACHIEVED     5.  Reduce panniculus edema volume to mild with improved tissue quality to boggy to reduce infection risk and to allow pt to complete se

## 2017-06-13 ENCOUNTER — HOSPITAL ENCOUNTER (OUTPATIENT)
Dept: OCCUPATIONAL MEDICINE | Facility: HOSPITAL | Age: 47
Setting detail: THERAPIES SERIES
Discharge: HOME | End: 2017-06-13
Attending: FAMILY MEDICINE
Payer: COMMERCIAL

## 2017-06-13 PROCEDURE — 97140 MANUAL THERAPY 1/> REGIONS: CPT

## 2017-06-14 NOTE — PROGRESS NOTES
Dx: Localized edema (R60.0) in abdomen       Authorized # of Visits:  19/30       Next MD visit/plan renewal:  6/18/2017  Fall Risk: Standard          Precautions:  Lymphedema; renal disease; abdominal sxs; cardiac; HTN      Subjective:   *Pt reports she w Negative Stemmer's sign bilaterally. MEASUREMENTS:  None taken   TREATMENT:  *Pt educated that Circaid garments should not be worn for the extended periods that she wore them or there will be resultant discomfort as she experienced.  Advised pt to remove garments, h/e good follow through with having some kind of compression on her lower legs at all times. Current binder system appearing to fit pt comfortably and staying in place during clinic activities.  Hopefully, she will be able to wear this system for

## 2017-06-15 ENCOUNTER — APPOINTMENT (OUTPATIENT)
Dept: OCCUPATIONAL MEDICINE | Facility: HOSPITAL | Age: 47
End: 2017-06-15
Attending: FAMILY MEDICINE
Payer: COMMERCIAL

## 2017-06-15 ENCOUNTER — TELEPHONE (OUTPATIENT)
Dept: OCCUPATIONAL MEDICINE | Facility: HOSPITAL | Age: 47
End: 2017-06-15

## 2017-06-15 NOTE — TELEPHONE ENCOUNTER
Message received earlier today that pt called to inform department that she may not be able to attend her visit today as she was informed that her caregiver may not be available. PSR told pt that appt time would be held for her if she is able to attend.   A

## 2017-06-20 ENCOUNTER — HOSPITAL ENCOUNTER (OUTPATIENT)
Dept: OCCUPATIONAL MEDICINE | Facility: HOSPITAL | Age: 47
Setting detail: THERAPIES SERIES
Discharge: HOME | End: 2017-06-20
Attending: FAMILY MEDICINE
Payer: COMMERCIAL

## 2017-06-20 PROCEDURE — 97140 MANUAL THERAPY 1/> REGIONS: CPT

## 2017-06-21 NOTE — PROGRESS NOTES
Progress Summary    Pt has attended 20/30, cancelled 5, and no shown 0 visits in Occupational Therapy. Subjective:   Pt reports she use of modified abdominal binder system until bedtime after last session.  Has not attempted to have caregiver apply as opportunity to use since modified d/t changes in caregivers. Pt participated in an abbreviated compression device trial with an advanced compression device (Flexitouch device); demonstrated a reduction in thigh and lower leg density and volume.      EDEMA/T boggy over lateral surface. Dorsal feet are boggy, pitting. Good tissue hydration. Negative Stemmer's sign bilaterally. Goals:   1. With caregiver's assist, pt will elevate her panniculus with bedsheet/towel rolls when seated when caregiver is present. of treatment and while under my care.     X___________________________________________________ Date____________________    Certification From: 7/26/4437  To:9/18/2017       TREATMENT:  *Re-assessment of status   *Manual lymph drainage of trunk/panniculus wi

## 2017-06-22 ENCOUNTER — TELEPHONE (OUTPATIENT)
Dept: OCCUPATIONAL MEDICINE | Facility: HOSPITAL | Age: 47
End: 2017-06-22

## 2017-06-22 ENCOUNTER — APPOINTMENT (OUTPATIENT)
Dept: OCCUPATIONAL MEDICINE | Facility: HOSPITAL | Age: 47
End: 2017-06-22
Attending: FAMILY MEDICINE
Payer: COMMERCIAL

## 2017-06-22 NOTE — TELEPHONE ENCOUNTER
Message received that pt called and cancelled today's session d/t having no caregiver to assist her.

## 2017-06-27 ENCOUNTER — HOSPITAL ENCOUNTER (OUTPATIENT)
Dept: OCCUPATIONAL MEDICINE | Facility: HOSPITAL | Age: 47
Setting detail: THERAPIES SERIES
Discharge: HOME | End: 2017-06-27
Attending: FAMILY MEDICINE
Payer: COMMERCIAL

## 2017-06-27 PROCEDURE — 97140 MANUAL THERAPY 1/> REGIONS: CPT

## 2017-06-27 NOTE — PROGRESS NOTES
Dx: Localized edema (R60.0) in abdomen       Authorized # of Visits:  21/30       Next MD visit/plan renewal:  7/20/2017  Fall Risk: Standard          Precautions:  Lymphedema; renal disease; abdominal sxs; cardiac; HTN      Subjective:   *Pt reports she w wear body shapers. Discussed whether she would be able to return to using body shapers at this time to assist with compression of her panniculus. Pt stating she will most likely need to find larger size of body shaper.  Discussed on-line resources for purch Velcro-closure garments for 20-23 hour periods. 4 sessions ACHIEVED     5. Reduce panniculus edema volume to mild with improved tissue quality to boggy to reduce infection risk and to allow pt to complete self-bathing independently. 30 sessions  6.  Pt will

## 2017-06-29 ENCOUNTER — HOSPITAL ENCOUNTER (OUTPATIENT)
Dept: OCCUPATIONAL MEDICINE | Facility: HOSPITAL | Age: 47
Setting detail: THERAPIES SERIES
Discharge: HOME | End: 2017-06-29
Attending: FAMILY MEDICINE
Payer: COMMERCIAL

## 2017-06-29 PROCEDURE — 97535 SELF CARE MNGMENT TRAINING: CPT

## 2017-06-30 NOTE — PROGRESS NOTES
Dx: Localized edema (R60.0) in abdomen       Authorized # of Visits:  22/30       Next MD visit/plan renewal:  7/20/2017  Fall Risk: Standard          Precautions:  Lymphedema; renal disease; abdominal sxs; cardiac; HTN      Subjective:   *Pt reports she w COMPRESSION:  *Abdominal binders with Komprex foam posterior insert (to be applied by caregiver when present).   *Bilateral lower legs: liner; Rosidal wrap; Circaid Juxtafit Premium ankle/foot wrap, size medium; Circaid Juxtafit Premium lower leg garment,

## 2017-06-30 NOTE — ADDENDUM NOTE
Encounter addended by: Grisel Michelle OT on: 6/30/2017  4:45 PM<BR>    Actions taken: Sign clinical note

## 2017-07-11 ENCOUNTER — HOSPITAL ENCOUNTER (OUTPATIENT)
Dept: OCCUPATIONAL MEDICINE | Facility: HOSPITAL | Age: 47
Setting detail: THERAPIES SERIES
Discharge: HOME | End: 2017-07-11
Attending: INTERNAL MEDICINE
Payer: COMMERCIAL

## 2017-07-11 PROCEDURE — 84156 ASSAY OF PROTEIN URINE: CPT | Performed by: INTERNAL MEDICINE

## 2017-07-11 PROCEDURE — 82657 ENZYME CELL ACTIVITY: CPT | Performed by: INTERNAL MEDICINE

## 2017-07-11 PROCEDURE — 97140 MANUAL THERAPY 1/> REGIONS: CPT

## 2017-07-11 PROCEDURE — 82570 ASSAY OF URINE CREATININE: CPT | Performed by: INTERNAL MEDICINE

## 2017-07-12 NOTE — PROGRESS NOTES
Dx: Localized edema (R60.0) in abdomen       Authorized # of Visits:  23/30       Next MD visit/plan renewal:  7/20/2017  Fall Risk: Standard          Precautions:  Lymphedema; renal disease; abdominal sxs; cardiac; HTN      Subjective:   *Pt reports she w with slight increase of 2.6cm for an overall reduction of 17.1cm. TREATMENT:  *Assessed fit of pt's body shaper.  Garment with good fit with front panel providing good compression over panniculus with pt able to use garment to lift panniculus throughout t for panniculus compression for  2-4 hours on days that caregiver is present. 6 sessions  4. Pt will be independent in self-manual lymph drainage of upper quadrant and panniculus in areas of functional reach.  8 sessions ACHIEVED   5/10 NEW GOAL:  Pt will to

## 2017-07-13 ENCOUNTER — APPOINTMENT (OUTPATIENT)
Dept: OCCUPATIONAL MEDICINE | Facility: HOSPITAL | Age: 47
End: 2017-07-13
Attending: INTERNAL MEDICINE
Payer: COMMERCIAL

## 2017-07-13 ENCOUNTER — TELEPHONE (OUTPATIENT)
Dept: OCCUPATIONAL MEDICINE | Facility: HOSPITAL | Age: 47
End: 2017-07-13

## 2017-07-18 ENCOUNTER — HOSPITAL ENCOUNTER (OUTPATIENT)
Dept: OCCUPATIONAL MEDICINE | Facility: HOSPITAL | Age: 47
Setting detail: THERAPIES SERIES
Discharge: HOME | End: 2017-07-18
Attending: INTERNAL MEDICINE
Payer: COMMERCIAL

## 2017-07-18 PROCEDURE — 97140 MANUAL THERAPY 1/> REGIONS: CPT

## 2017-07-20 ENCOUNTER — APPOINTMENT (OUTPATIENT)
Dept: OCCUPATIONAL MEDICINE | Facility: HOSPITAL | Age: 47
End: 2017-07-20
Attending: INTERNAL MEDICINE
Payer: COMMERCIAL

## 2017-07-21 NOTE — PROGRESS NOTES
Discharge Summary    Pt has attended 24/30, cancelled 7, and no shown 0 visits in Occupational Therapy.      Subjective:   Pt reports nearly daily use of compression body shaper and Circaid lower leg garments, excepting past 2 days as she has been busy wi practice in the area to where pt is moving. EDEMA/TISSUE QUALITY:  TRUNK/PANNICULUS: Circumferential measurement around upper quadrant 18cm inferior to Left axilla is relatively stable at 172.0cm; around trunk at level of navel remains 190.0cm.  [place] therapist in new location. 5.  Contact this therapist/department with any questions/concerns. Goals:   1. With caregiver's assist, pt will elevate her panniculus with bedsheet/towel rolls when seated when caregiver is present.  4 sessions ACHIEVED Total Timed Treatment: 55 min  Total Treatment Time: 60 min      LE Circumferential Measurements (cm) from 7/11/2017 session   LEFT    RIGHT    20cm above Superior Border of Patella (SBP)  -- --   10cm above SBP  70.0 69.5   SBP  69.0 57.0         10cm b

## 2017-07-24 ENCOUNTER — APPOINTMENT (OUTPATIENT)
Dept: OCCUPATIONAL MEDICINE | Facility: HOSPITAL | Age: 47
End: 2017-07-24
Attending: INTERNAL MEDICINE
Payer: COMMERCIAL

## 2017-07-26 ENCOUNTER — APPOINTMENT (OUTPATIENT)
Dept: OCCUPATIONAL MEDICINE | Facility: HOSPITAL | Age: 47
End: 2017-07-26
Attending: INTERNAL MEDICINE
Payer: COMMERCIAL

## (undated) NOTE — LETTER
Patient Name: Nadege Lamar  YOB: 1970          MRN number:  [id number]  Date:  5/18/2017  Referring Physician:  Rachelle Hendrickson MD    Progress Summary  Pt has attended 14/30, cancelled 3, and no shown 0 visits in Occupational Therapy.      Subjecti individually and also d/t its ability to address truncal lymphedema. Objective:   Pt attending Occupational Therapy x2/week for complete decongestive therapy of bilateral lower quadrants with soft tissue mobilization of dense areas of lymphedema.  She LEs:  Left LE circumferential lymphedema volume has decreased by 27.9cm since last progress summary for an overall reduction of 16.5cm since initial eval. Right LE has decreased by 17.0cm for an overall reduction of 10.5cm.  Density over thighs is supple ov assist with compression device prescription; caregiver instruction. Patient/Family/Caregiver was advised of these findings, precautions, and treatment options and has agreed to actively participate in planning and for this course of care.     Thank y

## (undated) NOTE — ED AVS SNAPSHOT
[facility] Emergency Department    [place]  [address] [address]    Phone:  [phone]    Fax:  Tequila Welchlance Jt   MRN: [id number]    Department:  [facility] Emergency Department   Date of Visit:  2/2/2 doctor. Please ask your health care provider, pharmacist or nurse if you have any questions regarding your home medications, including potential side effects.               Medication List      START taking these medications     ondansetron 4 MG Tbdp   Cherry Armor receive this, we would really appreciate it if you could take the time to complete it. Thank you! You were examined and treated today on an urgent basis only. This was not a substitute for ongoing medical care.  Often, one Emergency Department visit d Gavin Hunt [address] ([address]) [address] [facility]  [facility] [address] [facility] [place] ([facility]) [phone] [address] ([address]) [phone]-

## (undated) NOTE — ED AVS SNAPSHOT
[facility] Emergency Department    [facility]  [address] [address]    Phone:  [phone]    Fax:  [address] Eduin Rivera   MRN: [id number]    Department:  [facility] Emergency Department   Date of Visit:  2/2/2 IF THERE IS ANY CHANGE OR WORSENING OF YOUR CONDITION, CALL YOUR PRIMARY CARE PHYSICIAN AT ONCE OR RETURN IMMEDIATELY TO THE EMERGENCY DEPARTMENT.     If you have been prescribed any medication(s), please fill your prescription right away and begin taking t

## (undated) NOTE — LETTER
Patient Name: Steven Marte  YOB: 1970          MRN number:  [id number]  Date:  7/18/2017  Referring Physician:  Checo Moreira MD      Discharge Summary    Pt has attended 24/30, cancelled 7, and no shown 0 visits in Occupational Therapy.      Sub over her abdomen. She received her Flexitouch device and completed in-home training. This therapist has provided her with contact information of certified lymphedema therapists who practice in the area to where pt is moving.       EDEMA/TISSUE QUALITY:  NIDHI 2.  Initiate use of Flexitouch device once settled in new home. 3.  Replace Circaid garments within 9-12 months of obtaining. 4.  Establish contact with certified lymphedema therapist in new location.    5.  Contact this therapist/department with any ques

## (undated) NOTE — IP AVS SNAPSHOT
[facility] [place] [address] [place], [address] ~ [phone]                Discharge Summary   4/7/2017    Josefina Atkins           Admission Information        Provider Department    4/7/2017 Karlos Greene MD  4nw-A         Bib Puente Commonly known as:  NORCO        Take 1 tablet by mouth 2 (two) times daily as needed.     Jeffery Nino                           insulin aspart 100 UNIT/ML Sopn   Last time this was given:  8 Units on 4/8/2017  5:46 PM   Commonly known as:  NOVOLOG   Next dose [address] [address]  [phone]        Future Appointments     Apr 10, 2017  4:00 PM   Computed Tomography with LISLE CT   [facility] DIAGNOSTIC IMAGING ([place] at [address] [facility] [address] )    [address] [place] [address] Occupational Therapy Visit Lymphedema with Ad Gill, [address] Occupational Therapy [facility])    [address]            May 02, 2017  2:20 PM   DIAGNOSTIC PROCEDURE wit May 16, 2017  1:00 PM   FOLLOW UP with Brandee Rico MD   RHEUMATOLOGY [place] at [address] [facility])    [address]  [address] [place] [address]   [phone]            May 16, 2017  5:30 PM   Occupational Therapy Visit Lymphedema with Ch 98.7    (03/24/17)  209 (03/07/17)  10.7      Recent Hematology Lab Results (cont.)  (Last 3 results in the past 90 days)    Neutrophil % Lymphocyte % Monocyte % Eosinophil % Basophil % Prelim Neut Abs Final Neut Abs Lymphocyte Abso Monocyte Absolu Eosinop can help with your Affordable Care Act coverage, as well as all types of Medicaid plans. To get signed up and covered, please call [phone] and ask to get set up for an insurance coverage that is in-network with [facility] Atorvastatin Calcium 40 MG Oral Tab       Use: Lower cholesterol, protect your heart   Most common side effects: Dizziness, constipation, abnormal liver function   What to report to your healthcare team:  Dizziness, muscle aches, constipation           Bl Mood and Thought Medications     Prochlorperazine Maleate (COMPAZINE) 10 mg tablet       Use: Treat conditions such as depression and thought disorders   Most common side effects: Dizziness, drowsiness, problems with movement   What to report to your healt

## (undated) NOTE — ED AVS SNAPSHOT
[facility] Emergency Department    [place]  [address] [address]    Phone:  [phone]    Fax:  [phone] [facility]   MRN: [id number]    Department:  [facility] Emergency Department   Date of Visit:  1/29/ IF THERE IS ANY CHANGE OR WORSENING OF YOUR CONDITION, CALL YOUR PRIMARY CARE PHYSICIAN AT ONCE OR RETURN IMMEDIATELY TO THE EMERGENCY DEPARTMENT.     If you have been prescribed any medication(s), please fill your prescription right away and begin taking t

## (undated) NOTE — ED AVS SNAPSHOT
[facility] Emergency Department    [place]  [address] [address]    Phone:  [phone]    Fax:  [phone] [facility]   MRN: [id number]    Department:  [facility] Emergency Department   Date of Visit:  1/29/ side effects. Medication List      START taking these medications     Ciprofloxacin HCl 250 MG Tabs   Quantity:  14 tablet   Commonly known as:  CIPRO   Take 1 tablet (250 mg total) by mouth 2 (two) times daily.          CONTINUE taking these m You were examined and treated today on an urgent basis only. This was not a substitute for ongoing medical care. Often, one Emergency Department visit does not uncover every injury or illness.  If you have been referred to a primary care or a specialist ph [place] [address]  [place]ra ([place]) [address] [facility] [place] [phone] [address]) [phone]                Additional Information       We are concerned

## (undated) NOTE — LETTER
Patient Name: Twin Ruiz  YOB: 1970          MRN number:  [id number]  Date:  4/18/2017  Referring Physician:  Orestes Barriga. MD    Progress Summary  Pt has attended 6/30, cancelled 1, and no shown 0 visits in Occupational Therapy.      Subjectiv Pt returns to therapy after last being seen on 4/6. Although she would benefit from more frequent sessions, she is only able to attend Occupational Therapy x2/week.  Being seen for complete decongestive therapy of bilateral lower quadrants with initial phas since initial eval. Right LE has increased by 6.5cm. Density over thighs is boggy over anterior surface; densely boggy, pitting over posterior surface. Knees are boggy over medial surfaces; supple over lateral surfaces.  Proximal 1/2 of lower legs are boggy Thank you for your referral. If you have any questions, please contact me at Dept: [phone]. Sincerely,  Electronically signed by therapist: Amy Ibrahim.  RADHA Charles/L, RADHA